# Patient Record
Sex: FEMALE | Race: BLACK OR AFRICAN AMERICAN | NOT HISPANIC OR LATINO
[De-identification: names, ages, dates, MRNs, and addresses within clinical notes are randomized per-mention and may not be internally consistent; named-entity substitution may affect disease eponyms.]

---

## 2020-08-21 PROBLEM — Z00.00 ENCOUNTER FOR PREVENTIVE HEALTH EXAMINATION: Status: ACTIVE | Noted: 2020-08-21

## 2020-08-27 ENCOUNTER — RESULT REVIEW (OUTPATIENT)
Age: 55
End: 2020-08-27

## 2020-08-27 ENCOUNTER — OUTPATIENT (OUTPATIENT)
Dept: OUTPATIENT SERVICES | Facility: HOSPITAL | Age: 55
LOS: 1 days | End: 2020-08-27
Payer: MEDICAID

## 2020-08-27 ENCOUNTER — APPOINTMENT (OUTPATIENT)
Age: 55
End: 2020-08-27

## 2020-08-27 PROCEDURE — 73564 X-RAY EXAM KNEE 4 OR MORE: CPT | Mod: 26,50

## 2020-08-27 PROCEDURE — 73564 X-RAY EXAM KNEE 4 OR MORE: CPT

## 2020-08-27 RX ORDER — MELOXICAM 7.5 MG/1
7.5 TABLET ORAL
Qty: 30 | Refills: 0 | Status: ACTIVE | COMMUNITY
Start: 2020-08-27 | End: 1900-01-01

## 2020-08-27 NOTE — PHYSICAL EXAM
[de-identified] : General: AAOx3 NAD\par LLE: Severe obesity\par No obvious deformity noted\par No joint line tenderness to palpation\par Mild crepitus with active and passive ROM\par mildly decreased active/passive ROM compared to right side\par SILT distally\par Motor 5/5 [de-identified] : XR of bilateral knees shows osteoarthritis of both knees. Bicompartmental arthritis in medial and patellofemoral joint on left.

## 2020-08-27 NOTE — ASSESSMENT
[FreeTextEntry1] : 54F with HTN, DM presenting with multiple years of worsening bilateral knee osteoarthritis, L > R\par - L Knee injection given in office today\par - Counseled on blood sugar management s/p injection\par - Prescription for meloxicam for pain relief\par - F/u as needed when injection wears off\par - Counseled patient about necessity for TKA eventually once failed steroid injections

## 2020-08-27 NOTE — PROCEDURE
[Injection] : Injection [Osteoarthritis] : Osteoarthritis [Patient] : patient [Knee Joint] : knee joint [Risk] : risk [Ethyl Chloride Spray] : ethyl chloride spray was used as a topical anesthetic [Alcohol] : Alcohol [Superior] : superior [Lateral] : lateral [22] : a 22-gauge [Triamcinolone 10mg/mL___(mL)] : [unfilled] ~UmL of 10mg/mL triamcinolone [1% Lidocaine___(mL)] : [unfilled] mL of 1% Lidocaine [Bandage Applied] : a bandage [None] : none [Tolerated Well] : The patient tolerated the procedure well

## 2020-08-27 NOTE — HISTORY OF PRESENT ILLNESS
[de-identified] : 54F w/Hx of Diabetes, HTN, HLD presents with 5+ years of worsening bilateral knee pain. Pain is worse in L > R. Has tried physical therapy multiple times without relief. OTC NSAIDs and tylenol also do not provide relief. Was told many years ago by doctor that she had osteoarthritis, but never followed up until today. Denies any numbness/tingling. No locking/catching. Denies trauma.

## 2020-11-19 ENCOUNTER — APPOINTMENT (OUTPATIENT)
Dept: ORTHOPEDIC SURGERY | Facility: CLINIC | Age: 55
End: 2020-11-19
Payer: MEDICAID

## 2020-11-19 PROCEDURE — 99213 OFFICE O/P EST LOW 20 MIN: CPT

## 2020-11-19 RX ORDER — CYCLOBENZAPRINE HYDROCHLORIDE 5 MG/1
5 TABLET, FILM COATED ORAL 3 TIMES DAILY
Qty: 30 | Refills: 1 | Status: ACTIVE | COMMUNITY
Start: 2020-11-19 | End: 1900-01-01

## 2020-11-30 NOTE — HISTORY OF PRESENT ILLNESS
[de-identified] : 54F with PMHx of HTN, DM, presenting with several years of worsening L knee pain. Pt has been diagnosed with L knee OA and has failed conservative management. Pt received intraarticular steroid injection during last visit which only provided 2 weeks of relief. Denies any numbness/tingling. Pt pain is currently "unbearable" and wants to pursue surgical intervention or receive another injection.

## 2020-11-30 NOTE — ASSESSMENT
[FreeTextEntry1] : 54F PMHx of DM and HTN presenting with end stage OA of the L knee\par - Indicated for L TKA\par - Pt will require pre-operative evaluation and clearance by PCP\par - Counseled pt on risks and benefits of surgery, including infection\par - Counseled pt on importance of quitting smoking, will obtain cotinine test pre-operatively to confirm abstinence\par - Plan for surgery on 12/18, pending clearance\par - Discussed importance of diabetic control for optimal outcomes\par - Pt agrees and willing to proceed with surgery

## 2020-11-30 NOTE — PHYSICAL EXAM
[de-identified] : General: AAOx3, NAD\par LLE: Mild crepitus with flexion/extension\par Significant TTP throughout and over joint line\par Motor 5/5 \par SILT distally\par No varus/valgus instability\par Pt ROM 0-90 with pain [de-identified] : XR shows significant degenerative tricompartmental OA of the L knee

## 2021-01-21 ENCOUNTER — APPOINTMENT (OUTPATIENT)
Dept: ORTHOPEDIC SURGERY | Facility: CLINIC | Age: 56
End: 2021-01-21

## 2021-02-09 ENCOUNTER — APPOINTMENT (OUTPATIENT)
Dept: ORTHOPEDIC SURGERY | Facility: CLINIC | Age: 56
End: 2021-02-09

## 2021-02-23 ENCOUNTER — APPOINTMENT (OUTPATIENT)
Dept: ORTHOPEDIC SURGERY | Facility: CLINIC | Age: 56
End: 2021-02-23

## 2021-03-04 ENCOUNTER — RESULT REVIEW (OUTPATIENT)
Age: 56
End: 2021-03-04

## 2021-03-04 ENCOUNTER — OUTPATIENT (OUTPATIENT)
Dept: OUTPATIENT SERVICES | Facility: HOSPITAL | Age: 56
LOS: 1 days | End: 2021-03-04
Payer: MEDICAID

## 2021-03-04 ENCOUNTER — APPOINTMENT (OUTPATIENT)
Dept: ORTHOPEDIC SURGERY | Facility: CLINIC | Age: 56
End: 2021-03-04
Payer: MEDICAID

## 2021-03-04 VITALS — WEIGHT: 258 LBS

## 2021-03-04 DIAGNOSIS — E66.9 OBESITY, UNSPECIFIED: ICD-10-CM

## 2021-03-04 DIAGNOSIS — Z86.39 PERSONAL HISTORY OF OTHER ENDOCRINE, NUTRITIONAL AND METABOLIC DISEASE: ICD-10-CM

## 2021-03-04 DIAGNOSIS — Z82.61 FAMILY HISTORY OF ARTHRITIS: ICD-10-CM

## 2021-03-04 DIAGNOSIS — M25.569 PAIN IN UNSPECIFIED KNEE: ICD-10-CM

## 2021-03-04 DIAGNOSIS — Z78.9 OTHER SPECIFIED HEALTH STATUS: ICD-10-CM

## 2021-03-04 DIAGNOSIS — Z80.6 FAMILY HISTORY OF LEUKEMIA: ICD-10-CM

## 2021-03-04 DIAGNOSIS — M17.10 UNILATERAL PRIMARY OSTEOARTHRITIS, UNSPECIFIED KNEE: ICD-10-CM

## 2021-03-04 DIAGNOSIS — M25.562 PAIN IN LEFT KNEE: ICD-10-CM

## 2021-03-04 DIAGNOSIS — Z72.3 LACK OF PHYSICAL EXERCISE: ICD-10-CM

## 2021-03-04 DIAGNOSIS — Z86.79 PERSONAL HISTORY OF OTHER DISEASES OF THE CIRCULATORY SYSTEM: ICD-10-CM

## 2021-03-04 PROCEDURE — 73564 X-RAY EXAM KNEE 4 OR MORE: CPT | Mod: 26,50

## 2021-03-04 PROCEDURE — 73564 X-RAY EXAM KNEE 4 OR MORE: CPT

## 2021-03-04 PROCEDURE — 99213 OFFICE O/P EST LOW 20 MIN: CPT

## 2021-03-05 PROBLEM — M25.562 ANTERIOR KNEE PAIN, LEFT: Status: ACTIVE | Noted: 2020-08-27

## 2021-03-05 PROBLEM — Z86.39 HISTORY OF DIABETES MELLITUS: Status: RESOLVED | Noted: 2021-03-04 | Resolved: 2021-03-05

## 2021-03-05 PROBLEM — Z82.61 FAMILY HISTORY OF ARTHRITIS: Status: ACTIVE | Noted: 2021-03-04

## 2021-03-05 PROBLEM — M25.569 KNEE PAIN: Status: ACTIVE | Noted: 2020-08-27

## 2021-03-05 PROBLEM — Z80.6 FAMILY HISTORY OF LEUKEMIA: Status: ACTIVE | Noted: 2021-03-04

## 2021-03-05 PROBLEM — Z72.3 DOES NOT EXERCISE: Status: ACTIVE | Noted: 2021-03-04

## 2021-03-05 PROBLEM — M17.10 KNEE OSTEOARTHRITIS: Status: RESOLVED | Noted: 2021-03-04 | Resolved: 2021-03-05

## 2021-03-05 PROBLEM — Z78.9 DOES NOT USE ILLICIT DRUGS: Status: ACTIVE | Noted: 2021-03-04

## 2021-03-05 PROBLEM — Z86.79 HISTORY OF HYPERTENSION: Status: RESOLVED | Noted: 2021-03-04 | Resolved: 2021-03-05

## 2021-03-05 RX ORDER — METFORMIN HYDROCHLORIDE 625 MG/1
TABLET ORAL
Refills: 0 | Status: ACTIVE | COMMUNITY

## 2021-03-05 RX ORDER — AMLODIPINE BESYLATE 10 MG/1
10 TABLET ORAL
Refills: 0 | Status: ACTIVE | COMMUNITY

## 2021-03-05 NOTE — ASSESSMENT
[FreeTextEntry1] : A/P: 55F presents with continued L knee pain and consultation for potential L TKA\par - Case discussed with Dr. Portillo for potential surgical staffing\par - Patient to be referred for optimization with bariatric surgeon, nutritionist and PCP\par - Patient extensively counseled on potential risks and benefits of surgery and her high risk for adverse outcomes\par - Patient to continue abstaining from all nicotine products and attempt preoperative weight reduction along with optimization of HbA1c\par - Patient will RTC in approximately 3 months for follow up with Dr. Portillo pending improvement as operative candidate

## 2021-03-05 NOTE — HISTORY OF PRESENT ILLNESS
[0] : a current pain level of 0/10 [None] : No exacerbating factors are noted [Ice] : relieved by ice [de-identified] : Patient is a 55F who presents for follow up evaluation of her L knee pain. Patient has had longstanding L knee pain. Has had limited trial of PT in the past without success. Had 1 steroid injection previously in our clinic with limited relief. Pt discussed surgical intervention previously but failed to obtain medical clearance and had not quit smoking at that time. Pt states knee is still painful, suffering from occasional buckling. She uses a cane for ambulation. Denies other injuries/symptoms at this time. [de-identified] : Patient describe pain as constant, achy, throbing and shooting.

## 2021-03-05 NOTE — PHYSICAL EXAM
[de-identified] : General: Obese adult female sitting upright; Well appearing, NAD; A&Ox3\par LLE: Mild clinical varus deformity\par Antalgic gait using cane for ambulation\par Knee AROM/PROM w 2-3 deg flexion contracture, 120-130 flexion\par Mild varus laxity compared to contralateral leg\par Varus deformity is correctable\par TTP over medial joint line\par +Patellar grind\par SILT distally\par Toes WWP [de-identified] : B/l Knee XR shows persistent tricompartmental knee OA worst in the medial compartment with varus deformity

## 2022-01-14 ENCOUNTER — EMERGENCY (EMERGENCY)
Facility: HOSPITAL | Age: 57
LOS: 0 days | Discharge: HOME | End: 2022-01-14
Attending: EMERGENCY MEDICINE
Payer: MEDICAID

## 2022-01-14 VITALS
SYSTOLIC BLOOD PRESSURE: 106 MMHG | WEIGHT: 250 LBS | RESPIRATION RATE: 18 BRPM | DIASTOLIC BLOOD PRESSURE: 70 MMHG | TEMPERATURE: 98 F | OXYGEN SATURATION: 94 % | HEART RATE: 84 BPM | HEIGHT: 66 IN

## 2022-01-14 DIAGNOSIS — F10.929 ALCOHOL USE, UNSPECIFIED WITH INTOXICATION, UNSPECIFIED: ICD-10-CM

## 2022-01-14 PROCEDURE — 99284 EMERGENCY DEPT VISIT MOD MDM: CPT

## 2022-01-14 NOTE — ED PROVIDER NOTE - CONSTITUTIONAL, MLM
normal... Well appearing, awake, alert, oriented to person, place, time/situation and in no apparent distress, some alcohol on breath noted

## 2022-01-14 NOTE — ED PROVIDER NOTE - OBJECTIVE STATEMENT
56 year old female with no significant pMH presenting to ED due to alcohol intoxication - states caught her  cheating then began drinking - got too drunk to drive and go home so EMS was called and pt came in with EMS for evaluation. Pt states feeling well otherwise, no SI/HI.

## 2022-01-14 NOTE — ED ADULT NURSE NOTE - OBJECTIVE STATEMENT
57yo F, Aox4, no pmhx, nkda, presents to ED As per EMS intoxicated, states pt upset on scene found out  cheating on her.  Denies any pain or discomfort.  Pt able to ambulate with steady gait.  Denies SI/HI.

## 2022-01-14 NOTE — ED PROVIDER NOTE - PATIENT PORTAL LINK FT
You can access the FollowMyHealth Patient Portal offered by [facility] by registering at the following website: [email or web address]. By joining [facility]’s FollowMyHealth portal, you will also be able to view your health information using other applications (apps) compatible with our system.

## 2022-01-14 NOTE — ED PROVIDER NOTE - NSFOLLOWUPINSTRUCTIONS_ED_ALL_ED_FT
Alcohol Intoxication    WHAT YOU NEED TO KNOW:    Alcohol intoxication is a harmful physical condition caused when you drink more alcohol than your body can handle. It is also called ethanol poisoning, or being drunk.    DISCHARGE INSTRUCTIONS:    Call your local emergency number (911 in the ) if:   •You have sudden trouble breathing or chest pain.      •You have a seizure.      •You feel sad enough to harm yourself or others.      Call your doctor if:   •You have hallucinations (you see or hear things that are not real).      •You cannot stop vomiting.      •You have questions or concerns about your condition or care.      Recommended alcohol limits:   •Men 21 to 64 years should limit alcohol to 2 drinks a day. Do not have more than 4 drinks in 1 day or more than 14 in 1 week.      •All women, and men 65 or older should limit alcohol to 1 drink in a day. Do not have more than 3 drinks in 1 day or more than 7 in 1 week. No amount of alcohol is okay during pregnancy.      Manage alcohol use:   •Decrease the amount you drink. This can help prevent health problems such as brain, heart, and liver damage, high blood pressure, diabetes, and cancer. If you cannot stop completely, healthcare providers can help you set goals to decrease the amount you drink.      •Plan weekly alcohol use. You will be less likely to drink more than the recommended limit if you plan ahead.      •Have food when you drink alcohol. Food will prevent alcohol from getting into your system too quickly. Eat before you have your first alcohol drink.      •Time your drinks carefully. Have no more than 1 drink in an hour. Have a liquid such as water, coffee, or a soft drink between alcohol drinks.      •Do not drive if you have had alcohol. Make sure someone who has not been drinking can help you get home.      •Do not drink alcohol if you are taking medicine. Alcohol is dangerous when you combine it with certain medicines, such as acetaminophen or blood pressure medicine. Talk to your healthcare provider about all the medicines you currently take.      For more information:   •Alcoholics Anonymous  Web Address: http://www.aa.org      •Substance Abuse and Mental Health Services Administration  [address]  [place] [address]  Web Address: http://www.[facility].gov        Follow up with your doctor as directed: Write down your questions so you remember to ask them during your visits.

## 2022-12-31 NOTE — ED ADULT NURSE NOTE - NSHOSCREENINGQ1_ED_ALL_ED
Case Management Discharge Note    Spouse to transport home            Selected Continued Care - Discharged on 12/31/2022 Admission date: 12/30/2022 - Discharge disposition: Home or Self Care            Transportation Services  Private: Car    Final Discharge Disposition Code: 01 - home or self-care   No

## 2024-08-05 NOTE — ED PROVIDER NOTE - CROS ED ENMT ALL NEG
Occupational Therapy    Patient not seen in therapy.     Discontinue therapy: physician request    Orders to discontinue OT services have been received and acknowledged. OT will discharge today, 8/5/24 as the pt and the pt's family have decided to transition to comfort care.       OBJECTIVE                          Documented in the chart in the following areas: Assessment/Plan.      Therapy procedure time and total treatment time can be found documented on the Time Entry flowsheet   negative...

## 2025-01-24 NOTE — ED ADULT NURSE NOTE - NSSEPSISSUSPECTED_ED_A_ED
NAD. Pending CTs. Neuro- intact. Neurosurgery recommended Pt was informed of CT results including nasal fx and  suspicious for an acute 4 mm posterior left parafalcine subdural hematoma with well understanding. NAD. Neuro- intact. Pending neuro evaluation. Neurosurgery recommended repeat B-CT on 9:50pm (4 hours later oxvv7ln CT). Neuro- intact. Attending MD Segundo: Stable CTH, neurosurgery made aware, pending final recs No Pt's evaluated by neurosurgery and recommended outpt f/u with Dr. Byrd as needed. Attending Liam Herrera:  Patient signed out to me, here for head trauma, found to have ich, stable on rpt scan, no antiplt/ac, no neuro deficit. Labs nonactionable. Results endorsed including unexpected incidental findings (copy of reports provided to patient). Return precautions given. Patient expressed verbal understanding. All questions answered.  Will DC with outpatient follow-up.